# Patient Record
Sex: MALE | Race: WHITE | Employment: OTHER | URBAN - METROPOLITAN AREA
[De-identification: names, ages, dates, MRNs, and addresses within clinical notes are randomized per-mention and may not be internally consistent; named-entity substitution may affect disease eponyms.]

---

## 2020-04-28 ENCOUNTER — OFFICE VISIT (OUTPATIENT)
Dept: ENT CLINIC | Age: 30
End: 2020-04-28
Payer: COMMERCIAL

## 2020-04-28 ENCOUNTER — PROCEDURE VISIT (OUTPATIENT)
Dept: AUDIOLOGY | Age: 30
End: 2020-04-28
Payer: COMMERCIAL

## 2020-04-28 VITALS — BODY MASS INDEX: 27.5 KG/M2 | HEIGHT: 72 IN | WEIGHT: 203 LBS

## 2020-04-28 PROCEDURE — G8427 DOCREV CUR MEDS BY ELIG CLIN: HCPCS | Performed by: OTOLARYNGOLOGY

## 2020-04-28 PROCEDURE — G8419 CALC BMI OUT NRM PARAM NOF/U: HCPCS | Performed by: OTOLARYNGOLOGY

## 2020-04-28 PROCEDURE — 69210 REMOVE IMPACTED EAR WAX UNI: CPT | Performed by: NURSE PRACTITIONER

## 2020-04-28 PROCEDURE — 92567 TYMPANOMETRY: CPT | Performed by: AUDIOLOGIST

## 2020-04-28 PROCEDURE — 99203 OFFICE O/P NEW LOW 30 MIN: CPT | Performed by: NURSE PRACTITIONER

## 2020-04-28 PROCEDURE — 1036F TOBACCO NON-USER: CPT | Performed by: OTOLARYNGOLOGY

## 2020-04-28 PROCEDURE — 4130F TOPICAL PREP RX AOE: CPT | Performed by: OTOLARYNGOLOGY

## 2020-04-28 RX ORDER — CIPROFLOXACIN HYDROCHLORIDE 3.5 MG/ML
4 SOLUTION/ DROPS TOPICAL 2 TIMES DAILY
Qty: 1 BOTTLE | Refills: 0 | Status: SHIPPED | OUTPATIENT
Start: 2020-04-28 | End: 2020-05-01

## 2020-04-28 ASSESSMENT — ENCOUNTER SYMPTOMS
ABDOMINAL PAIN: 0
EYES NEGATIVE: 1
RESPIRATORY NEGATIVE: 1
STRIDOR: 0
SHORTNESS OF BREATH: 0
GASTROINTESTINAL NEGATIVE: 1

## 2020-04-28 NOTE — PATIENT INSTRUCTIONS
This patient was referred for audiometric/tympanometric testing by Dr. Geena House due to Eustachian tube dysfunction. Tympanometry revealed flat tympanograms, bilaterally. The results were reviewed with the patient and parent. Recommendations for follow up will be made pending physician consult.     Ed Keisha

## 2020-04-28 NOTE — PROGRESS NOTES
[facility] Otolaryngology  Dr. Agnieszka Bonilla. DANGELO Mcgregor Ms.Ed. New Consult       Patient Name:  Abril Kent  :  1990     CHIEF C/O:    Chief Complaint   Patient presents with    New Patient     patient c/o having a hard time hearing, has a lot of wax build up       HISTORY OBTAINED FROM:  patient, mother    HISTORY OF PRESENT ILLNESS:       Titus Rollins is a 26y.o. year old male, here today for new hearing loss and pressure in bilateral ears. Mother states that he has had chronic wax production bilaterally and clean at home on a regular basis. The patient states that hearing loss seem equal bilaterally. Denies any recent injuries to the ears. No regular noise exposure. He does use ear bud head phones at times. He denies any sinus pressure or congestion. No recent illness or infections. History reviewed. No pertinent past medical history. History reviewed. No pertinent surgical history. Current Outpatient Medications:     ciprofloxacin (CILOXAN) 0.3 % ophthalmic solution, Place 4 drops in ear(s) 2 times daily for 3 days, Disp: 1 Bottle, Rfl: 0  Patient has no known allergies. Social History     Tobacco Use    Smoking status: Never Smoker    Smokeless tobacco: Never Used   Substance Use Topics    Alcohol use: Not on file    Drug use: Not on file     History reviewed. No pertinent family history. Review of Systems   Constitutional: Negative. Negative for activity change and appetite change. HENT: Positive for ear pain and hearing loss. Eyes: Negative. Respiratory: Negative. Negative for shortness of breath and stridor. Cardiovascular: Negative. Negative for chest pain and palpitations. Gastrointestinal: Negative. Negative for abdominal pain. Endocrine: Negative. Genitourinary: Negative. Musculoskeletal: Negative. Skin: Negative. Neurological: Negative. Negative for dizziness. Hematological: Negative. Psychiatric/Behavioral: Negative.         Ht 6' (1.829 m) Wt 203 lb (92.1 kg)   BMI 27.53 kg/m²   Physical Exam  Constitutional:       Appearance: Normal appearance. HENT:      Head: Normocephalic. Right Ear: External ear normal. There is impacted cerumen. Left Ear: External ear normal. There is impacted cerumen. Nose: Nose normal.      Mouth/Throat:      Lips: Pink. Mouth: Mucous membranes are moist.      Tongue: No lesions. Palate: No mass. Pharynx: Oropharynx is clear. Eyes:      Conjunctiva/sclera: Conjunctivae normal.      Pupils: Pupils are equal, round, and reactive to light. Neck:      Musculoskeletal: Normal range of motion. No neck rigidity or muscular tenderness. Cardiovascular:      Rate and Rhythm: Normal rate and regular rhythm. Pulses: Normal pulses. Pulmonary:      Effort: Pulmonary effort is normal. No respiratory distress. Breath sounds: Normal breath sounds. No stridor. Abdominal:      Palpations: Abdomen is soft. Tenderness: There is no abdominal tenderness. Musculoskeletal: Normal range of motion. Skin:     General: Skin is warm and dry. Neurological:      General: No focal deficit present. Mental Status: He is alert and oriented to person, place, and time. Psychiatric:         Mood and Affect: Mood normal.         Behavior: Behavior normal.         Thought Content: Thought content normal.         Judgment: Judgment normal.         Tympanogram reviewed with patient and mother. Type B flat curves. Test most likely obscured by cerumen impaction. Normal appearing tymps following cerumen removal.    Cerumen removal     Auditory canal(s) both ears completely obstructed with cerumen. Cerumen was gently removed using soft plastic curette. Tympanic membranes are intact following the procedure. Auditory canals appear inflamed. IMPRESSION/PLAN:    Payal Milligan was seen today for new patient.     Diagnoses and all orders for this visit:    Bilateral impacted cerumen  -     DC REMOVAL IMPACTED CERUMEN INSTRUMENTATION UNILAT    Other noninfective acute otitis externa of both ears    Other orders  -     ciprofloxacin (CILOXAN) 0.3 % ophthalmic solution; Place 4 drops in ear(s) 2 times daily for 3 days      OTC Debrox drops 1-2 times weekly with gentle irrigation    Follow up in 3 months for cerumen cleaning.     Linda Li, MSN, FNP-C  [facility], Nose and Throat

## 2021-01-15 ENCOUNTER — OFFICE VISIT (OUTPATIENT)
Dept: ENT CLINIC | Age: 31
End: 2021-01-15
Payer: COMMERCIAL

## 2021-01-15 VITALS — TEMPERATURE: 97.5 F | WEIGHT: 194 LBS | BODY MASS INDEX: 26.28 KG/M2 | HEIGHT: 72 IN

## 2021-01-15 DIAGNOSIS — H61.23 BILATERAL IMPACTED CERUMEN: Primary | ICD-10-CM

## 2021-01-15 PROCEDURE — 69210 REMOVE IMPACTED EAR WAX UNI: CPT | Performed by: OTOLARYNGOLOGY

## 2021-01-23 ASSESSMENT — ENCOUNTER SYMPTOMS
SINUS PAIN: 0
RHINORRHEA: 0
SHORTNESS OF BREATH: 0
COUGH: 0
VOMITING: 0

## 2021-01-23 NOTE — PROGRESS NOTES
[facility] Otolaryngology  Dr. Shonna Mccloud. Lexi Laguerre. Ms.Ed        Patient Name:  Cynthia Hashimoto  :  1990     CHIEF C/O:    Chief Complaint   Patient presents with    Cerumen Impaction     F/U ear cleaning    Ear Problem       HISTORY OBTAINED FROM:  patient    HISTORY OF PRESENT ILLNESS:       Reina Linton is a 31y.o. year old male, here today for follow up of here for known history of recurrent ear cerumen impactions. Patient is having increasing levels of cerumen impaction and difficulty with hearing, no other complaints today of ear pain or drainage current complaints of vertigo. No current complaints of nasal congestion epistaxis fever chills sore throat or hoarseness. History reviewed. No pertinent past medical history. History reviewed. No pertinent surgical history. No current outpatient medications on file. Patient has no known allergies. Social History     Tobacco Use    Smoking status: Never Smoker    Smokeless tobacco: Never Used   Substance Use Topics    Alcohol use: Not on file    Drug use: Not on file     History reviewed. No pertinent family history. Review of Systems   Constitutional: Negative for chills and fever. HENT: Positive for congestion. Negative for ear discharge, hearing loss, postnasal drip, rhinorrhea and sinus pain. Respiratory: Negative for cough and shortness of breath. Cardiovascular: Negative for chest pain and palpitations. Gastrointestinal: Negative for vomiting. Skin: Negative for rash. Allergic/Immunologic: Negative for environmental allergies. Neurological: Negative for dizziness and headaches. Hematological: Does not bruise/bleed easily. All other systems reviewed and are negative. Temp 97.5 °F (36.4 °C)   Ht 6' (1.829 m)   Wt 194 lb (88 kg)   BMI 26.31 kg/m²   Physical Exam  Vitals signs and nursing note reviewed. Constitutional:       Appearance: He is well-developed. HENT:      Head: Normocephalic and atraumatic. Right Ear: Tympanic membrane and ear canal normal. There is impacted cerumen. Left Ear: Tympanic membrane and ear canal normal. There is impacted cerumen. Nose: No congestion or rhinorrhea. Eyes:      Pupils: Pupils are equal, round, and reactive to light. Neck:      Musculoskeletal: Normal range of motion. Thyroid: No thyromegaly. Trachea: No tracheal deviation. Cardiovascular:      Rate and Rhythm: Normal rate. Pulmonary:      Effort: Pulmonary effort is normal. No respiratory distress. Musculoskeletal: Normal range of motion. Lymphadenopathy:      Cervical: No cervical adenopathy. Skin:     General: Skin is warm. Findings: No erythema. Neurological:      Mental Status: He is alert. Cranial Nerves: No cranial nerve deficit. Procedure: Cerumen Impaction    Pre-op: Cerumen Impaction    Post Op: Same    Procedure: Cerumen Impaction removal    Surgeon: Shobha Ulloa    Procedure: Under microscopic assistance large cerumen impaction was removed using gentle suction and curette. TM intact B/L, Pt tolerated procedure well    Complications: None    Blood Loss Minimial        IMPRESSION/PLAN:  Patient underwent cerumen impaction of one microscopic cyst today without complication. Continue current medical therapy follow-up as needed. Dr. Cinthya Shanks Client.  Otolaryngology Facial Plastic Surgery  :  [facility] Otolaryngology/Facial Plastic Surgery Residency  Associate Clinical Professor:  Maco Jeronimo, [facility]

## 2021-04-01 ENCOUNTER — IMMUNIZATION (OUTPATIENT)
Dept: PRIMARY CARE CLINIC | Age: 31
End: 2021-04-01
Payer: COMMERCIAL

## 2021-04-01 PROCEDURE — 0011A COVID-19, MODERNA VACCINE 100MCG/0.5ML DOSE: CPT | Performed by: NURSE PRACTITIONER

## 2021-04-01 PROCEDURE — 91301 COVID-19, MODERNA VACCINE 100MCG/0.5ML DOSE: CPT | Performed by: NURSE PRACTITIONER

## 2021-04-29 ENCOUNTER — IMMUNIZATION (OUTPATIENT)
Dept: PRIMARY CARE CLINIC | Age: 31
End: 2021-04-29
Payer: COMMERCIAL

## 2021-04-29 PROCEDURE — 0012A COVID-19, MODERNA VACCINE 100MCG/0.5ML DOSE: CPT | Performed by: NURSE PRACTITIONER

## 2021-04-29 PROCEDURE — 91301 COVID-19, MODERNA VACCINE 100MCG/0.5ML DOSE: CPT | Performed by: NURSE PRACTITIONER

## 2021-08-20 ENCOUNTER — OFFICE VISIT (OUTPATIENT)
Dept: ENT CLINIC | Age: 31
End: 2021-08-20
Payer: COMMERCIAL

## 2021-08-20 VITALS
DIASTOLIC BLOOD PRESSURE: 80 MMHG | WEIGHT: 194 LBS | HEIGHT: 72 IN | SYSTOLIC BLOOD PRESSURE: 128 MMHG | BODY MASS INDEX: 26.28 KG/M2

## 2021-08-20 DIAGNOSIS — H61.23 BILATERAL IMPACTED CERUMEN: Primary | ICD-10-CM

## 2021-08-20 PROCEDURE — 69210 REMOVE IMPACTED EAR WAX UNI: CPT | Performed by: OTOLARYNGOLOGY

## 2021-08-26 ASSESSMENT — ENCOUNTER SYMPTOMS
COUGH: 0
SHORTNESS OF BREATH: 0
VOMITING: 0

## 2021-08-26 NOTE — PROGRESS NOTES
[facility] Otolaryngology  Dr. Judith Ambriz. Suresh Murrieta. Ms.Ed        Patient Name:  Prem White  :  1990     CHIEF C/O:    Chief Complaint   Patient presents with    Cerumen Impaction     6 month cerumen       HISTORY OBTAINED FROM:  patient    HISTORY OF PRESENT ILLNESS:       [facility] is a 27y.o. year old male, here today for follow up of history of bilateral cerumen impactions, no other complaints today of ear pain or drainage. Complaints of new significant hearing loss. History reviewed. No pertinent past medical history. History reviewed. No pertinent surgical history. No current outpatient medications on file. Patient has no known allergies. Social History     Tobacco Use    Smoking status: Never Smoker    Smokeless tobacco: Never Used   Substance Use Topics    Alcohol use: Not on file    Drug use: Not on file     History reviewed. No pertinent family history. Review of Systems   Constitutional: Negative for chills and fever. HENT: Negative for ear discharge and hearing loss. Respiratory: Negative for cough and shortness of breath. Cardiovascular: Negative for chest pain and palpitations. Gastrointestinal: Negative for vomiting. Skin: Negative for rash. Allergic/Immunologic: Negative for environmental allergies. Neurological: Negative for dizziness and headaches. Hematological: Does not bruise/bleed easily. All other systems reviewed and are negative. /80   Ht 6' (1.829 m)   Wt 194 lb (88 kg)   BMI 26.31 kg/m²   Physical Exam  Vitals and nursing note reviewed. Constitutional:       Appearance: He is well-developed. HENT:      Right Ear: There is impacted cerumen. Eyes:      Pupils: Pupils are equal, round, and reactive to light. Neck:      Thyroid: No thyromegaly. Trachea: No tracheal deviation. Cardiovascular:      Rate and Rhythm: Normal rate. Pulmonary:      Effort: Pulmonary effort is normal. No respiratory distress.    Musculoskeletal: General: Normal range of motion. Cervical back: Normal range of motion. Lymphadenopathy:      Cervical: No cervical adenopathy. Skin:     General: Skin is warm. Findings: No erythema. Neurological:      Mental Status: He is alert. Cranial Nerves: No cranial nerve deficit. Procedure: Cerumen Impaction    Pre-op: Cerumen Impaction    Post Op: Same    Procedure: Cerumen Impaction removal    Surgeon: Audra Stacy    Procedure: Under microscopic assistance large cerumen impaction was removed using gentle suction and curette. TM intact B/L, Pt tolerated procedure well    Complications: None    Blood Loss Minimial      IMPRESSION/PLAN:  Patient seen and examined cerumen packs removed without complication today, continue current medical therapies and follow-up as needed. Dr. Bessy Ko.  Otolaryngology Facial Plastic Surgery  :  [address] [place] [address] Otolaryngology/Facial Plastic Surgery Residency  Associate Clinical Professor:  Obed Bryan [facility]

## 2022-12-14 ENCOUNTER — HOSPITAL ENCOUNTER (EMERGENCY)
Age: 32
Discharge: HOME | End: 2022-12-14
Payer: COMMERCIAL

## 2022-12-14 VITALS
DIASTOLIC BLOOD PRESSURE: 90 MMHG | OXYGEN SATURATION: 99 % | HEIGHT: 72 IN | HEART RATE: 77 BPM | SYSTOLIC BLOOD PRESSURE: 144 MMHG | TEMPERATURE: 98 F | WEIGHT: 189 LBS | BODY MASS INDEX: 25.6 KG/M2 | RESPIRATION RATE: 18 BRPM

## 2022-12-14 DIAGNOSIS — H61.23 BILATERAL IMPACTED CERUMEN: Primary | ICD-10-CM

## 2022-12-14 PROCEDURE — 99211 OFF/OP EST MAY X REQ PHY/QHP: CPT

## 2022-12-14 PROCEDURE — 69209 REMOVE IMPACTED EAR WAX UNI: CPT

## 2022-12-14 ASSESSMENT — PAIN - FUNCTIONAL ASSESSMENT: PAIN_FUNCTIONAL_ASSESSMENT: 0-10

## 2022-12-14 ASSESSMENT — PAIN SCALES - GENERAL: PAINLEVEL_OUTOF10: 0

## 2022-12-14 NOTE — ED PROVIDER NOTES
Department of Emergency Medicine   [facility]  Provider Note  Admit Date/RoomTime: 2022  8:12 AM  Room:   NAME: David Lazo  : 1990  MRN: [id number]     Chief Complaint:  Ear Problem (History of  ear wax build up dad flushed ear now he cant hear out of it  right)    History of Present Illness       David Lazo is a 34 y.o. old male with a past medical history of: History reviewed. No pertinent past medical history. ,who presents to urgent care center with complaint of an earwax problem in the right ear he cannot hear out of it his dad attempted to flush it but now he cannot hear out of the right ear at all he is denying any ear pain does not have any respiratory symptoms nasal congestion or sore throat. He has a problem with earwax buildup and gets his ears flushed twice a year by his PCP. ROS    Pertinent positives and negatives are stated within HPI, all other systems reviewed and are negative. Past Surgical History:  has no past surgical history on file. Social History:  reports that he has never smoked. He has never used smokeless tobacco.  Family History: family history is not on file. Allergies: Patient has no known allergies. Physical Exam   Oxygen Saturation Interpretation: Normal.        ED Triage Vitals [22 0814]   BP Temp Temp src Heart Rate Resp SpO2 Height Weight   (!) 144/90 98 °F (36.7 °C) -- 77 18 99 % 6' (1.829 m) 189 lb (85.7 kg)         Constitutional:  Alert, appears well, no distress  Ears:  External Ears: Bilateral normal.                 TM's & External Canals:  abnormal external canal right ear - ceruminosis impacting canal, abnormal external canal left ear - ceruminosis impacting canal.  Nose:   There is no abnormalities present  Throat:  Pharynx without injection, exudate, or tonsillar hypertrophy. Airway patient. Neck:  Normal ROM. Supple.   Respiratory:  Clear to auscultation and breath sounds equal.    CV: Regular rate and rhythm, normal heart sounds, without pathological murmurs, ectopy, gallops, or rubs. Skin:  No rashes, erythema   Lymphatic: No lymphangitis or adenopathy noted. Neurological:  Oriented. Motor functions intact. Lab / Imaging Results   (All laboratory and radiology results have been personally reviewed by myself)  Labs:  No results found for this visit on 12/14/22. Imaging: All Radiology results interpreted by Radiologist unless otherwise noted. No orders to display     ED Course / Medical Decision Making   Medications - No data to display       MDM:   He has an earwax buildup in both ears they were ears were flushed by the LPN with return of a large amount of cerumen I did examine the ears following the procedure TM and T's M's are intact his ear canals are clear he said he can now hear. He should follow-up with his doctor    Plan of Care/Counseling:  I reviewed today's visit with the patient and father in addition to providing specific details for the plan of care and counseling regarding the diagnosis and prognosis. Questions are answered at this time and are agreeable with the plan. Assessment      1. Bilateral impacted cerumen      Plan   Discharge to home and advised to contact follow up with your Dr      As needed   Patient condition is good    New Medications   There are no discharge medications for this patient. Electronically signed by FREDERIC Martin CNP   DD: 12/14/22  **This report was transcribed using voice recognition software. Every effort was made to ensure accuracy; however, inadvertent computerized transcription errors may be present.   END OF ED PROVIDER NOTE      FREDERIC Martin CNP  12/14/22 1718

## 2024-01-12 LAB
ALBUMIN SERPL-MCNC: 4.4 G/DL (ref 3.5–5.2)
ALP SERPL-CCNC: 63 U/L (ref 40–129)
ALT SERPL-CCNC: 62 U/L (ref 0–40)
ANION GAP SERPL CALCULATED.3IONS-SCNC: 13 MMOL/L (ref 7–16)
AST SERPL-CCNC: 43 U/L (ref 0–39)
BASOPHILS # BLD: 0.04 K/UL (ref 0–0.2)
BASOPHILS NFR BLD: 1 % (ref 0–2)
BILIRUB SERPL-MCNC: 0.5 MG/DL (ref 0–1.2)
BUN SERPL-MCNC: 14 MG/DL (ref 6–20)
CALCIUM SERPL-MCNC: 9.5 MG/DL (ref 8.6–10.2)
CHLORIDE SERPL-SCNC: 105 MMOL/L (ref 98–107)
CHOLEST SERPL-MCNC: 308 MG/DL
CO2 SERPL-SCNC: 22 MMOL/L (ref 22–29)
CREAT SERPL-MCNC: 1.1 MG/DL (ref 0.7–1.2)
EOSINOPHIL # BLD: 0.08 K/UL (ref 0.05–0.5)
EOSINOPHILS RELATIVE PERCENT: 1 % (ref 0–6)
ERYTHROCYTE [DISTWIDTH] IN BLOOD BY AUTOMATED COUNT: 13.4 % (ref 11.5–15)
GFR SERPL CREATININE-BSD FRML MDRD: >60 ML/MIN/1.73M2
GLUCOSE SERPL-MCNC: 80 MG/DL (ref 74–99)
HBA1C MFR BLD: 5.5 % (ref 4–5.6)
HCT VFR BLD AUTO: 48.6 % (ref 37–54)
HDLC SERPL-MCNC: 37 MG/DL
HGB BLD-MCNC: 15.7 G/DL (ref 12.5–16.5)
IMM GRANULOCYTES # BLD AUTO: 0.06 K/UL (ref 0–0.58)
IMM GRANULOCYTES NFR BLD: 1 % (ref 0–5)
LDLC SERPL CALC-MCNC: 206 MG/DL
LYMPHOCYTES NFR BLD: 2.32 K/UL (ref 1.5–4)
LYMPHOCYTES RELATIVE PERCENT: 34 % (ref 20–42)
MCH RBC QN AUTO: 30.4 PG (ref 26–35)
MCHC RBC AUTO-ENTMCNC: 32.3 G/DL (ref 32–34.5)
MCV RBC AUTO: 94.2 FL (ref 80–99.9)
MONOCYTES NFR BLD: 0.56 K/UL (ref 0.1–0.95)
MONOCYTES NFR BLD: 8 % (ref 2–12)
NEUTROPHILS NFR BLD: 56 % (ref 43–80)
NEUTS SEG NFR BLD: 3.82 K/UL (ref 1.8–7.3)
PLATELET # BLD AUTO: 179 K/UL (ref 130–450)
PMV BLD AUTO: 12.6 FL (ref 7–12)
POTASSIUM SERPL-SCNC: 4.2 MMOL/L (ref 3.5–5)
PROT SERPL-MCNC: 7 G/DL (ref 6.4–8.3)
RBC # BLD AUTO: 5.16 M/UL (ref 3.8–5.8)
SODIUM SERPL-SCNC: 140 MMOL/L (ref 132–146)
TRIGL SERPL-MCNC: 323 MG/DL
TSH SERPL DL<=0.05 MIU/L-ACNC: 6.04 UIU/ML (ref 0.27–4.2)
VLDLC SERPL CALC-MCNC: 65 MG/DL
WBC OTHER # BLD: 6.9 K/UL (ref 4.5–11.5)

## 2024-04-01 LAB
ALBUMIN SERPL-MCNC: 4.5 G/DL (ref 3.5–5.2)
ALP SERPL-CCNC: 52 U/L (ref 40–129)
ALT SERPL-CCNC: 26 U/L (ref 0–40)
ANION GAP SERPL CALCULATED.3IONS-SCNC: 12 MMOL/L (ref 7–16)
AST SERPL-CCNC: 23 U/L (ref 0–39)
BILIRUB SERPL-MCNC: 0.4 MG/DL (ref 0–1.2)
BUN SERPL-MCNC: 12 MG/DL (ref 6–20)
CALCIUM SERPL-MCNC: 9 MG/DL (ref 8.6–10.2)
CHLORIDE SERPL-SCNC: 106 MMOL/L (ref 98–107)
CHOLEST SERPL-MCNC: 255 MG/DL
CO2 SERPL-SCNC: 24 MMOL/L (ref 22–29)
CREAT SERPL-MCNC: 1.1 MG/DL (ref 0.7–1.2)
GFR SERPL CREATININE-BSD FRML MDRD: >90 ML/MIN/1.73M2
GLUCOSE SERPL-MCNC: 87 MG/DL (ref 74–99)
HDLC SERPL-MCNC: 39 MG/DL
LDLC SERPL CALC-MCNC: 158 MG/DL
POTASSIUM SERPL-SCNC: 3.9 MMOL/L (ref 3.5–5)
PROT SERPL-MCNC: 6.6 G/DL (ref 6.4–8.3)
SODIUM SERPL-SCNC: 142 MMOL/L (ref 132–146)
TRIGL SERPL-MCNC: 288 MG/DL
TSH SERPL DL<=0.05 MIU/L-ACNC: 6.23 UIU/ML (ref 0.27–4.2)
VLDLC SERPL CALC-MCNC: 58 MG/DL